# Patient Record
Sex: FEMALE | Race: WHITE | NOT HISPANIC OR LATINO | Employment: OTHER | URBAN - METROPOLITAN AREA
[De-identification: names, ages, dates, MRNs, and addresses within clinical notes are randomized per-mention and may not be internally consistent; named-entity substitution may affect disease eponyms.]

---

## 2019-09-24 ENCOUNTER — OFFICE VISIT - HEALTHEAST (OUTPATIENT)
Dept: FAMILY MEDICINE | Facility: CLINIC | Age: 79
End: 2019-09-24

## 2019-09-24 DIAGNOSIS — I10 HYPERTENSION, UNSPECIFIED TYPE: ICD-10-CM

## 2019-09-24 DIAGNOSIS — J01.00 ACUTE MAXILLARY SINUSITIS, RECURRENCE NOT SPECIFIED: ICD-10-CM

## 2019-09-24 RX ORDER — AMITRIPTYLINE HYDROCHLORIDE 10 MG/1
10 TABLET ORAL
Status: SHIPPED | COMMUNITY
Start: 2018-06-06 | End: 2022-12-17

## 2019-09-24 RX ORDER — LEVOTHYROXINE SODIUM 100 UG/1
TABLET ORAL
Status: SHIPPED | COMMUNITY
Start: 2019-06-11 | End: 2022-12-17

## 2019-09-24 RX ORDER — PRAVASTATIN SODIUM 40 MG
40 TABLET ORAL
Status: SHIPPED | COMMUNITY
Start: 2019-07-10 | End: 2022-12-17

## 2019-09-24 RX ORDER — LEVOTHYROXINE SODIUM 100 MCG
TABLET ORAL
Status: SHIPPED | COMMUNITY
Start: 2019-08-26 | End: 2022-12-17

## 2019-09-24 RX ORDER — LOSARTAN POTASSIUM 100 MG/1
TABLET ORAL
Status: SHIPPED | COMMUNITY
Start: 2019-08-12 | End: 2022-12-17

## 2019-09-24 RX ORDER — ATENOLOL 25 MG/1
25 TABLET ORAL
Status: SHIPPED | COMMUNITY
Start: 2019-06-11 | End: 2022-12-17

## 2019-09-24 RX ORDER — DOCUSATE SODIUM 100 MG/1
CAPSULE, LIQUID FILLED ORAL
Status: SHIPPED | COMMUNITY
Start: 2019-07-19 | End: 2022-12-17

## 2019-09-24 RX ORDER — DOCUSATE SODIUM 100 MG/1
200 CAPSULE, LIQUID FILLED ORAL
Status: SHIPPED | COMMUNITY
Start: 2019-03-25

## 2019-09-24 RX ORDER — ASPIRIN 325 MG
325 TABLET, DELAYED RELEASE (ENTERIC COATED) ORAL
Status: SHIPPED | COMMUNITY
Start: 2017-09-28 | End: 2022-12-17

## 2019-09-24 RX ORDER — EZETIMIBE 10 MG/1
10 TABLET ORAL
Status: SHIPPED | COMMUNITY
Start: 2019-06-11

## 2019-09-24 ASSESSMENT — MIFFLIN-ST. JEOR: SCORE: 1260.42

## 2021-06-01 NOTE — PATIENT INSTRUCTIONS - HE
You were seen today for a sinus infection.    Symptoms management:  - May use Tylenol or Ibuprofen for discomfort and/or fever if present  - May try saline irrigation to relieve congestion (see instructions below)  - Use of nasal steroids (Flonase) as prescribed  - Take antibiotics as prescribed for the full course (even if symptoms have improved)    Reasons to come back for re-evaluation:  - Develop a fever of 100.4F or current fever worsens  - Troubles seeing or double vision  - Swelling or redness around one or both eyes  - Sfiff neck  - Symptoms are not improving over the next 5 days    Buffered normal saline nasal irrigation   The benefits   1. Saline (saltwater) washes the mucus and irritants from your nose.   2. The sinus passages are moisturized.   3. Studies have also shown that a nasal irrigation improves cell function (the cells that move the mucus work better).   The recipe   Use a one-quart glass jar that is thoroughly cleansed.   You may use a large medical syringe (30 cc), water pick with an irrigation tip (preferred method), squeeze bottle, or Neti pot. Do not use a baby bulb syringe. The syringe or pick should be sterilized frequently or replaced every two to three weeks to avoid contamination and infection.   Fill with water that has been distilled, previously boiled, or otherwise sterilized. Plain tap water is not recommended, because it is not necessarily sterile.   Add 1 to 1  heaping teaspoons of pickling/nilton salt. Do not use table salt, because it contains a large number of additives.   Add 1 teaspoon of baking soda (pure bicarbonate).   Mix ingredients together, and store at room temperature. Discard after one week.   You may also make up a solution from premixed packets that are commercially prepared specifically for nasal irrigation.   The instructions   Irrigate your nose with saline one to two times per day.   If you have been told to use nasal medication, you should always use your  saline solution first. The nasal medication is much more effective when sprayed onto clean nasal membranes, and the spray will reach deeper into the nose.   Pour the amount of fluid you plan to use into a clean bowl. Do not put your used syringe back into the storage container, because it contaminates your solution.   You may warm the solution slightly in the microwave, but be sure that the solution is not hot.   Bend over the sink (some people do this in the shower) and squirt the solution into each side of your nose, aiming the stream toward the back of your head, not the top of your head. The solution should flow into one nostril and out of the other, but it will not harm you if you swallow a little.   Some people experience a little burning sensation the first few times they use buffered saline solution, but this usually goes away after they adapt to it.

## 2021-06-01 NOTE — PROGRESS NOTES
Assessment:       1. Acute maxillary sinusitis, recurrence not specified  doxycycline (VIBRA-TABS) 100 MG tablet    fluticasone (VERAMYST) 27.5 mcg/actuation nasal spray   2. Hypertension, unspecified type       Medical Decision Making  Patient presents with headache concerning for a bacterial sinusitis.  She is also noted to be hypertensive.  She has no other neurological findingsher and denies vision changes, nausea, vomiting, numbness/tingling, and slurred speech.  Patient has continued to take her blood pressure medications regularly.  She has noted worsening congestion for 2 weeks most consistent with a bacterial sinusitis and tenderness to palpation of the sinuses.  Despite patient complaining of chest heaviness, see no signs of pneumonia with good lung auscultation on exam.  Patient states she has been stressed this last week due to recent travel and has been taking large doses of Motrin for headaches, which I believe are contributing to her hypertension today.  Recommended watching symptoms closely, and to return immediately in the emergency room for reevaluation if symptoms worsen.  Also recommend the patient follow-up nonurgently with her PCP for blood pressure recheck in 1 to 2 weeks.      Plan:       Doxycycline per orders.  Discontinue use of ibuprofen, may continue to use acetaminophen.  Trial of nasal steroids.  Probiotics recommended.  Continue to drink plenty fluids.  Discussed signs of worsening symptoms and when to follow-up with PCP if no symptom improvement.      Patient Instructions   You were seen today for a sinus infection.    Symptoms management:  - May use Tylenol or Ibuprofen for discomfort and/or fever if present  - May try saline irrigation to relieve congestion (see instructions below)  - Use of nasal steroids (Flonase) as prescribed  - Take antibiotics as prescribed for the full course (even if symptoms have improved)    Reasons to come back for re-evaluation:  - Develop a fever of  100.4F or current fever worsens  - Troubles seeing or double vision  - Swelling or redness around one or both eyes  - Sfiff neck  - Symptoms are not improving over the next 5 days    Buffered normal saline nasal irrigation   The benefits   1. Saline (saltwater) washes the mucus and irritants from your nose.   2. The sinus passages are moisturized.   3. Studies have also shown that a nasal irrigation improves cell function (the cells that move the mucus work better).   The recipe   Use a one-quart glass jar that is thoroughly cleansed.   You may use a large medical syringe (30 cc), water pick with an irrigation tip (preferred method), squeeze bottle, or Neti pot. Do not use a baby bulb syringe. The syringe or pick should be sterilized frequently or replaced every two to three weeks to avoid contamination and infection.   Fill with water that has been distilled, previously boiled, or otherwise sterilized. Plain tap water is not recommended, because it is not necessarily sterile.   Add 1 to 1  heaping teaspoons of pickling/nilton salt. Do not use table salt, because it contains a large number of additives.   Add 1 teaspoon of baking soda (pure bicarbonate).   Mix ingredients together, and store at room temperature. Discard after one week.   You may also make up a solution from premixed packets that are commercially prepared specifically for nasal irrigation.   The instructions   Irrigate your nose with saline one to two times per day.   If you have been told to use nasal medication, you should always use your saline solution first. The nasal medication is much more effective when sprayed onto clean nasal membranes, and the spray will reach deeper into the nose.   Pour the amount of fluid you plan to use into a clean bowl. Do not put your used syringe back into the storage container, because it contaminates your solution.   You may warm the solution slightly in the microwave, but be sure that the solution is not hot.    Bend over the sink (some people do this in the shower) and squirt the solution into each side of your nose, aiming the stream toward the back of your head, not the top of your head. The solution should flow into one nostril and out of the other, but it will not harm you if you swallow a little.   Some people experience a little burning sensation the first few times they use buffered saline solution, but this usually goes away after they adapt to it.         Subjective:       Dolores Sumner is a 78 y.o. female here for evaluation of a possible sinus infection.  Symptoms began 2 weeks ago with acute worsening today.  Symptoms include a cough with heavy chest, dyspnea on exertion, fatigue, and facial pain.  She has been taking Motrin, Mucinex, and Coricidin with no relief.  She denies fevers.  She does not having a history of hypertension.  She is currently taking losartan and atenolol daily.  She denies nausea, vomiting, vision changes, dizziness, and slurred speech.    The following portions of the patient's history were reviewed and updated as appropriate: allergies, current medications and problem list.    Review of Systems  Pertinent items are noted in HPI.     Allergies  Allergies   Allergen Reactions     Adhesive Tape-Silicones Rash     Amoxicillin-Pot Clavulanate Diarrhea     Within 1 day of starting medication     Erythromycin Nausea Only and Rash     Lovastatin      myalgia     Rosuvastatin      myalgia     Simvastatin      myalgia     Suture, Monocryl      CAT GUT   SKIN DOES NOT HEAL       No family history on file.    Social History     Socioeconomic History     Marital status:      Spouse name: None     Number of children: None     Years of education: None     Highest education level: None   Occupational History     None   Social Needs     Financial resource strain: None     Food insecurity:     Worry: None     Inability: None     Transportation needs:     Medical: None     Non-medical: None  "  Tobacco Use     Smoking status: Never Smoker     Smokeless tobacco: Never Used   Substance and Sexual Activity     Alcohol use: None     Drug use: None     Sexual activity: None   Lifestyle     Physical activity:     Days per week: None     Minutes per session: None     Stress: None   Relationships     Social connections:     Talks on phone: None     Gets together: None     Attends Jainism service: None     Active member of club or organization: None     Attends meetings of clubs or organizations: None     Relationship status: None     Intimate partner violence:     Fear of current or ex partner: None     Emotionally abused: None     Physically abused: None     Forced sexual activity: None   Other Topics Concern     None   Social History Narrative     None         Objective:       BP (!) 198/85 (Patient Site: Right Arm, Patient Position: Sitting, Cuff Size: Adult Large)   Pulse 64   Temp 97.9  F (36.6  C) (Oral)   Resp 20   Ht 5' 3\" (1.6 m)   Wt 181 lb 1 oz (82.1 kg)   SpO2 95%   Breastfeeding? No   BMI 32.07 kg/m    General appearance: alert, appears stated age, cooperative, no distress and non-toxic  Head: Normocephalic, without obvious abnormality, atraumatic, sinuses tender to percussion  Ears: TMs intact with mucoid fluid and bulging, no erythema  Nose: no discharge  Throat: lips, mucosa, and tongue normal; teeth and gums normal  Neck: no adenopathy and supple, symmetrical, trachea midline  Lungs: clear to auscultation bilaterally and No rhonchi, rales, or wheezing  Heart: regular rate and rhythm, S1, S2 normal, no murmur, click, rub or gallop  Extremities: extremities normal, atraumatic, no cyanosis or edema      "

## 2021-06-03 VITALS
HEIGHT: 63 IN | TEMPERATURE: 97.9 F | SYSTOLIC BLOOD PRESSURE: 198 MMHG | WEIGHT: 181.06 LBS | DIASTOLIC BLOOD PRESSURE: 85 MMHG | HEART RATE: 64 BPM | RESPIRATION RATE: 20 BRPM | OXYGEN SATURATION: 95 % | BODY MASS INDEX: 32.08 KG/M2

## 2022-12-17 ENCOUNTER — ANCILLARY PROCEDURE (OUTPATIENT)
Dept: GENERAL RADIOLOGY | Facility: CLINIC | Age: 82
End: 2022-12-17
Attending: FAMILY MEDICINE
Payer: MEDICARE

## 2022-12-17 ENCOUNTER — OFFICE VISIT (OUTPATIENT)
Dept: FAMILY MEDICINE | Facility: CLINIC | Age: 82
End: 2022-12-17
Payer: MEDICARE

## 2022-12-17 VITALS
OXYGEN SATURATION: 95 % | BODY MASS INDEX: 29.9 KG/M2 | RESPIRATION RATE: 16 BRPM | HEART RATE: 83 BPM | SYSTOLIC BLOOD PRESSURE: 159 MMHG | DIASTOLIC BLOOD PRESSURE: 85 MMHG | TEMPERATURE: 98 F | WEIGHT: 168.8 LBS

## 2022-12-17 DIAGNOSIS — H10.32 ACUTE BACTERIAL CONJUNCTIVITIS OF LEFT EYE: ICD-10-CM

## 2022-12-17 DIAGNOSIS — R05.1 ACUTE COUGH: ICD-10-CM

## 2022-12-17 DIAGNOSIS — H65.03 BILATERAL ACUTE SEROUS OTITIS MEDIA, RECURRENCE NOT SPECIFIED: ICD-10-CM

## 2022-12-17 DIAGNOSIS — B96.89 ACUTE BACTERIAL RHINOSINUSITIS: ICD-10-CM

## 2022-12-17 DIAGNOSIS — J01.90 ACUTE BACTERIAL RHINOSINUSITIS: ICD-10-CM

## 2022-12-17 DIAGNOSIS — J18.0 BRONCHOPNEUMONIA: Primary | ICD-10-CM

## 2022-12-17 PROBLEM — T50.905A ADVERSE REACTION TO DRUG: Status: ACTIVE | Noted: 2018-01-03

## 2022-12-17 PROBLEM — U07.1 COVID-19: Status: ACTIVE | Noted: 2022-01-13

## 2022-12-17 PROBLEM — E78.5 DYSLIPIDEMIA: Status: ACTIVE | Noted: 2020-01-13

## 2022-12-17 PROBLEM — I35.0 AORTIC VALVE STENOSIS: Status: ACTIVE | Noted: 2020-02-01

## 2022-12-17 PROBLEM — I70.213 ATHEROSCLEROSIS OF NATIVE ARTERIES OF EXTREMITIES WITH INTERMITTENT CLAUDICATION, BILATERAL LEGS (H): Status: ACTIVE | Noted: 2019-03-25

## 2022-12-17 PROBLEM — I50.33 ACUTE ON CHRONIC DIASTOLIC HEART FAILURE (H): Status: ACTIVE | Noted: 2020-02-03

## 2022-12-17 PROBLEM — Z98.890 HISTORY OF STENT INSERTION OF RENAL ARTERY: Status: ACTIVE | Noted: 2022-12-17

## 2022-12-17 PROBLEM — H35.3230 BILATERAL EXUDATIVE AGE-RELATED MACULAR DEGENERATION (H): Status: ACTIVE | Noted: 2022-01-07

## 2022-12-17 PROBLEM — D35.02 ADENOMA OF LEFT ADRENAL GLAND: Status: ACTIVE | Noted: 2021-01-25

## 2022-12-17 PROBLEM — R06.00 DYSPNEA: Status: ACTIVE | Noted: 2020-01-13

## 2022-12-17 PROBLEM — I73.9 PERIPHERAL VASCULAR DISEASE (H): Status: ACTIVE | Noted: 2018-01-03

## 2022-12-17 PROBLEM — I65.23 BILATERAL CAROTID ARTERY STENOSIS: Status: ACTIVE | Noted: 2020-05-10

## 2022-12-17 PROBLEM — I25.10 CORONARY ATHEROSCLEROSIS: Status: ACTIVE | Noted: 2020-02-10

## 2022-12-17 PROBLEM — Z95.2 S/P TAVR (TRANSCATHETER AORTIC VALVE REPLACEMENT): Status: ACTIVE | Noted: 2020-04-07

## 2022-12-17 PROBLEM — I77.0: Status: ACTIVE | Noted: 2021-03-13

## 2022-12-17 PROCEDURE — 99204 OFFICE O/P NEW MOD 45 MIN: CPT | Performed by: FAMILY MEDICINE

## 2022-12-17 PROCEDURE — 71046 X-RAY EXAM CHEST 2 VIEWS: CPT | Mod: TC | Performed by: RADIOLOGY

## 2022-12-17 RX ORDER — OLMESARTAN MEDOXOMIL AND HYDROCHLOROTHIAZIDE 40/25 40; 25 MG/1; MG/1
1 TABLET ORAL DAILY
COMMUNITY
End: 2022-12-17

## 2022-12-17 RX ORDER — ATORVASTATIN CALCIUM 40 MG/1
40 TABLET, FILM COATED ORAL DAILY
COMMUNITY

## 2022-12-17 RX ORDER — CLOPIDOGREL BISULFATE 75 MG/1
75 TABLET ORAL
COMMUNITY
End: 2022-12-17

## 2022-12-17 RX ORDER — DOXYCYCLINE HYCLATE 100 MG
100 TABLET ORAL 2 TIMES DAILY
Qty: 20 TABLET | Refills: 0 | Status: SHIPPED | OUTPATIENT
Start: 2022-12-17 | End: 2022-12-27

## 2022-12-17 RX ORDER — METOPROLOL SUCCINATE 100 MG/1
TABLET, EXTENDED RELEASE ORAL
COMMUNITY
Start: 2022-02-18 | End: 2022-12-17

## 2022-12-17 RX ORDER — FUROSEMIDE 20 MG
20 TABLET ORAL
COMMUNITY
Start: 2021-06-03 | End: 2022-12-17

## 2022-12-17 RX ORDER — OLMESARTAN MEDOXOMIL, AMLODIPINE AND HYDROCHLOROTHIAZIDE TABLET 40/10/25 MG 40; 10; 25 MG/1; MG/1; MG/1
TABLET ORAL EVERY 24 HOURS
COMMUNITY
Start: 2022-07-11 | End: 2022-12-17

## 2022-12-17 RX ORDER — AMLODIPINE BESYLATE 10 MG/1
10 TABLET ORAL
COMMUNITY
Start: 2021-06-08 | End: 2022-12-17 | Stop reason: ALTCHOICE

## 2022-12-17 RX ORDER — TOBRAMYCIN 3 MG/ML
SOLUTION/ DROPS OPHTHALMIC
Qty: 4 ML | Refills: 0 | Status: SHIPPED | OUTPATIENT
Start: 2022-12-17 | End: 2022-12-24

## 2022-12-17 RX ORDER — BENZONATATE 200 MG/1
200 CAPSULE ORAL 3 TIMES DAILY PRN
Qty: 30 CAPSULE | Refills: 0 | Status: SHIPPED | OUTPATIENT
Start: 2022-12-17

## 2022-12-17 RX ORDER — ASPIRIN 81 MG/1
81 TABLET, CHEWABLE ORAL DAILY
COMMUNITY

## 2022-12-17 RX ORDER — HYDRALAZINE HYDROCHLORIDE 25 MG/1
25 TABLET, FILM COATED ORAL
COMMUNITY
Start: 2021-05-10 | End: 2022-12-17

## 2022-12-17 RX ORDER — FLUTICASONE PROPIONATE 50 MCG
1 SPRAY, SUSPENSION (ML) NASAL 2 TIMES DAILY
Qty: 16 G | Refills: 0 | Status: SHIPPED | OUTPATIENT
Start: 2022-12-17

## 2022-12-17 RX ORDER — LOSARTAN POTASSIUM 100 MG/1
100 TABLET ORAL
COMMUNITY
Start: 2021-06-08 | End: 2022-12-17

## 2022-12-17 RX ORDER — OLMESARTAN MEDOXOMIL, AMLODIPINE AND HYDROCHLOROTHIAZIDE TABLET 20/5/12.5 MG 20; 5; 12.5 MG/1; MG/1; MG/1
1 TABLET ORAL DAILY
COMMUNITY
Start: 2022-10-08

## 2022-12-17 RX ORDER — METOPROLOL SUCCINATE 25 MG/1
100 TABLET, EXTENDED RELEASE ORAL
COMMUNITY
Start: 2021-06-03 | End: 2022-12-17

## 2022-12-17 RX ORDER — ATORVASTATIN CALCIUM 40 MG/1
40 TABLET, FILM COATED ORAL
COMMUNITY
Start: 2021-06-08 | End: 2022-12-17

## 2022-12-17 RX ORDER — ALBUTEROL SULFATE 90 UG/1
2 AEROSOL, METERED RESPIRATORY (INHALATION) EVERY 6 HOURS
Qty: 18 G | Refills: 0 | Status: SHIPPED | OUTPATIENT
Start: 2022-12-17

## 2022-12-17 NOTE — PATIENT INSTRUCTIONS
Start doxycycline 2 times a day for 10 days always take with food to prevent nausea vomiting even the pharmacist tells you not to take it with food     Start tobramycin eyedrops every 2 hours until sleep tonight and left eye then starting tomorrow 4 times a day for the remaining 6 days    if the other eye becomes red or discharge use drops in that eye as well    Throw away your over the counter eye drops you use daily  and start new drops after you finish the antibiotic drops      Start Flonase1 spray in each nostril in am and bedtime for sinus congestion and fluid behind the ear drums    Start Mucinex -1200 mg daily -the generic is fine -for congestion/sinus congestion-will also help with fluid behind the ear drums      Take tessalon perles ( benzonatate) pills for cough up to 3 times a day will not make you sleepy      Start albuterol inhaler 2 puffs in a.m. and bedtime and every 4-6 hours as needed for wheezing  if you are having a coughing spell you can take 2 puffs as needed to help decrease the cough     if you are requiring every 2 hours use of the albuterol inhaler, shortness of breath and/or fever  to ER

## 2022-12-17 NOTE — PROGRESS NOTES
ASSESSMENT/PLAN:      ICD-10-CM    1. Bronchopneumonia  J18.0 XR Chest 2 Views     benzonatate (TESSALON) 200 MG capsule     doxycycline hyclate (VIBRA-TABS) 100 MG tablet     albuterol (PROAIR HFA/PROVENTIL HFA/VENTOLIN HFA) 108 (90 Base) MCG/ACT inhaler     fluticasone (FLONASE) 50 MCG/ACT nasal spray      2. Acute bacterial rhinosinusitis  J01.90     B96.89       3. Bilateral acute serous otitis media, recurrence not specified  H65.03       4. Acute bacterial conjunctivitis of left eye  H10.32 tobramycin (TOBREX) 0.3 % ophthalmic solution    2 days, mattering, grand child with pink eye                 Reviewed medication instructions and side effects. Follow up if experiences side effects.     I reviewed supportive care, otc meds to use if needed, expected course, and signs of concern.  Follow up as needed or if she does not improve within  1-2 days or if worsens in any way.  Reviewed red flag symptoms and is to go to the ER if experiences any of these.     The use of Dragon/Wejo dictation services may have been used to construct the content in this note; any grammatical or spelling errors are non-intentional. Please contact the author of this note directly if you are in need of any clarification.      On the day of the encounter, time spend on chart review, patient visit, review of testing, documentation was 45 minutes          Patient Instructions     Start doxycycline 2 times a day for 10 days always take with food to prevent nausea vomiting even the pharmacist tells you not to take it with food     Start tobramycin eyedrops every 2 hours until sleep tonight and left eye then starting tomorrow 4 times a day for the remaining 6 days    if the other eye becomes red or discharge use drops in that eye as well    Throw away your over the counter eye drops you use daily  and start new drops after you finish the antibiotic drops      Start Flonase1 spray in each nostril in am and bedtime for sinus congestion  and fluid behind the ear drums    Start Mucinex -1200 mg daily -the generic is fine -for congestion/sinus congestion-will also help with fluid behind the ear drums      Take tessalon perles ( benzonatate) pills for cough up to 3 times a day will not make you sleepy      Start albuterol inhaler 2 puffs in a.m. and bedtime and every 4-6 hours as needed for wheezing  if you are having a coughing spell you can take 2 puffs as needed to help decrease the cough     if you are requiring every 2 hours use of the albuterol inhaler, shortness of breath and/or fever  to ER                   Patient presents with:  URI: Cold sx for 3 weeks, chest congestion       Subjective     Dolores Sumner is a 82 year old female who presents to clinic today for the following health issues:    HPI     URI Adult    Onset of symptoms was 3 week(s) ago.  Course of illness is worsening.    Severity moderate  Current and Associated symptoms: stuffy nose, cough - productive, sore throat, facial pain/pressure and fatigue, left eye drainage and mattering    no fever/chills, chills at onset of symptoms, cough has worsened, no sob/no tavarez  Treatment measures tried include Tylenol/ and OTC Cough med.  Predisposing factors include ill contact: Family member-grand child with pink eye and flight to [place] for thanksgiving       covid vaccinated- bivalent and all boosters   Flu shot   covid-2/2022       No past medical history on file.  Social History     Tobacco Use     Smoking status: Never     Smokeless tobacco: Never   Substance Use Topics     Alcohol use: Not on file       Current Outpatient Medications   Medication Sig Dispense Refill     albuterol (PROAIR HFA/PROVENTIL HFA/VENTOLIN HFA) 108 (90 Base) MCG/ACT inhaler Inhale 2 puffs into the lungs every 6 hours 18 g 0     aspirin (ASA) 81 MG chewable tablet Take 81 mg by mouth daily       atorvastatin (LIPITOR) 40 MG tablet Take 40 mg by mouth daily       benzonatate (TESSALON) 200 MG capsule Take 1  capsule (200 mg) by mouth 3 times daily as needed for cough 30 capsule 0     docusate sodium (COLACE) 100 MG capsule [DOCUSATE SODIUM (COLACE) 100 MG CAPSULE] Take 200 mg by mouth.       doxycycline hyclate (VIBRA-TABS) 100 MG tablet Take 1 tablet (100 mg) by mouth 2 times daily for 10 days 20 tablet 0     ezetimibe (ZETIA) 10 mg tablet [EZETIMIBE (ZETIA) 10 MG TABLET] Take 10 mg by mouth.       fluticasone (FLONASE) 50 MCG/ACT nasal spray Spray 1 spray into both nostrils 2 times daily 16 g 0     Olmesartan-amLODIPine-HCTZ 20-5-12.5 MG TABS Take 1 tablet by mouth daily       tobramycin (TOBREX) 0.3 % ophthalmic solution Place 1-2 drops Into the left eye every 2 hours for 1 day, THEN 1-2 drops 4 times daily for 6 days. 4 mL 0     diphenhydrAMINE (BENADRYL) 25 MG tablet Take 25 mg by mouth       Allergies   Allergen Reactions     Adhesive Tape-Silicones [Adhesive Tape] Rash     Amoxicillin-Pot Clavulanate [Augmentin] Diarrhea     Within 1 day of starting medication     Erythromycin Nausea and Rash     Lovastatin Unknown     myalgia     Rosuvastatin Unknown     myalgia     Simvastatin Unknown     myalgia     Suture, Monocryl [Suture] Unknown     CAT GUT , SKIN DOES NOT HEAL             ROS are negative, except as otherwise noted HPI      Objective    BP (!) 159/85   Pulse 83   Temp 98  F (36.7  C) (Oral)   Resp 16   Wt 76.6 kg (168 lb 12.8 oz)   SpO2 95%   BMI 29.90 kg/m    Body mass index is 29.9 kg/m .  Physical Exam   GENERAL:alert and mild fatigue  EYES: Eyes grossly normal to inspection on right, mild ejected sclera and conjuctiva on left, thin crusting lower eyelid,, PERRL EOMI  HENT: ear canals clear  and TM's dull/air fluid levels bilateral  Nose congestion  and mouth without ulcers or lesions, posterior pharynx mildly injected   NECK: no adenopathy, no asymmetry,   RESP: lungs decreased breath sounds, right base - no rales, rhonchi or wheezes  CV: regular rate and rhythm, , no murmur, click or rub,   MS:  no gross musculoskeletal defects noted, no edema  NEURO: Normal strength and tone, mentation intact and speech normal  Gait normal     Diagnostic Test Results:  Labs reviewed in Epic  Results for orders placed or performed in visit on 12/17/22   XR Chest 2 Views     Status: None    Narrative    EXAM: XR CHEST 2 VIEWS  LOCATION: [facility]  DATE/TIME: 12/17/2022 10:42 AM    INDICATION: Three-week history of cough, decreased breath sounds in the right lung base  COMPARISON: None.      Impression    IMPRESSION: Transcatheter aortic valve replacement. Right coronary artery stent. Calcified aortic atherosclerosis. Mild degenerative change thoracic spine. Lungs clear.

## 2025-01-05 ENCOUNTER — OFFICE VISIT (OUTPATIENT)
Dept: URGENT CARE | Facility: URGENT CARE | Age: 85
End: 2025-01-05
Payer: MEDICARE

## 2025-01-05 VITALS
WEIGHT: 160 LBS | SYSTOLIC BLOOD PRESSURE: 177 MMHG | HEART RATE: 83 BPM | TEMPERATURE: 97.7 F | BODY MASS INDEX: 28.34 KG/M2 | DIASTOLIC BLOOD PRESSURE: 81 MMHG | OXYGEN SATURATION: 97 % | RESPIRATION RATE: 16 BRPM

## 2025-01-05 DIAGNOSIS — R05.1 ACUTE COUGH: Primary | ICD-10-CM

## 2025-01-05 PROCEDURE — 99214 OFFICE O/P EST MOD 30 MIN: CPT | Performed by: FAMILY MEDICINE

## 2025-01-05 RX ORDER — ACETAMINOPHEN 500 MG
500 TABLET ORAL
COMMUNITY

## 2025-01-05 RX ORDER — BENZONATATE 100 MG/1
100 CAPSULE ORAL 3 TIMES DAILY PRN
Qty: 21 CAPSULE | Refills: 1 | Status: SHIPPED | OUTPATIENT
Start: 2025-01-05

## 2025-01-05 RX ORDER — DOXYCYCLINE HYCLATE 100 MG
100 TABLET ORAL 2 TIMES DAILY
Qty: 14 TABLET | Refills: 0 | Status: SHIPPED | OUTPATIENT
Start: 2025-01-05 | End: 2025-01-12

## 2025-01-05 RX ORDER — VITAMIN B COMPLEX
2000 TABLET ORAL
COMMUNITY

## 2025-01-05 NOTE — PROGRESS NOTES
Assessment & Plan     Acute cough  - doxycycline hyclate (VIBRA-TABS) 100 MG tablet  Dispense: 14 tablet; Refill: 0  - benzonatate (TESSALON) 100 MG capsule  Dispense: 21 capsule; Refill: 1       Given the duration of symptoms and reports of persistent and possibly worsening cough although no shortness of breath we came to shared decision to proceed with doxycycline to cover for walking pneumonia which has been prevalent in the surrounding area.  We discussed if symptoms not better in the next 48 hours to return to be evaluated.  Vital signs stable.  Patient appears well-hydrated.  Tessalon/honey advised to help with cough severity.     See AVS summary for additional recommendations reviewed with patient during this visit.       Nemesio Nur MD   [facility]    Anabella Bernal is a 84 year old female who presents to clinic today for the following health issues:  Chief Complaint   Patient presents with    URI     Productive cough with yellow gray phlegm for rough 2 week. Chest heaviness.      HPI    Patient has been here visiting her daughter she lives in [place] has had a cough for about 2 weeks possibly worsened over the last few days but has not had any fevers.  There was some other family members who are sick but improved by now.  No history of lung disease.  Previously an occasional smoker but quit many decades ago.  No sinus pressure or heavy drainage.  History of tonsillectomy    Using mucinex and cough suppressant    No new pharyngitis    No exposures to individuals with COVID/pneumonia/pertussis  Patient Active Problem List    Diagnosis Date Noted    History of stent insertion of renal artery 12/17/2022     Priority: Medium    COVID-19 01/13/2022     Priority: Medium    Bilateral exudative age-related macular degeneration (H) 01/07/2022     Priority: Medium     Last Assessment & Plan:   Formatting of this note might be different from the original.  Getting inj's.      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  Hormonal testing neg 2021     24 mm in 2011 - 2011 CT mentions it was present in 2008 2012 CT - stable   2015 CT - 22 mm left adrenal nodule   2020 CT - 18 mm left adrenal nodule      Bilateral carotid artery stenosis 05/10/2020     Priority: Medium     Formatting of this note might be different from the original.  Formatting of this note might be different from the original.  60-69% left 2021  Formatting of this note might be different from the original.  60-69% left 2021     Last Assessment & Plan:   Formatting of this note might be different from the original.  60-69% left 2021   Plan: recheck w/ vascular thi year      S/P TAVR (transcatheter aortic valve replacement) 04/07/2020     Priority: Medium     Last Assessment & Plan:   Formatting of this note might be different from the original.  TAVR serg jorge. recenc echo      Coronary atherosclerosis 02/10/2020     Priority: Medium     Last Assessment & Plan:   Formatting of this note might be different from the original.  No cp.   Mild SOB w/ exertion - for many years w/o change.      Acute on chronic diastolic heart failure (H) 02/03/2020     Priority: Medium    Aortic valve stenosis 02/01/2020     Priority: Medium    Dyslipidemia 01/13/2020     Priority: Medium    Dyspnea 01/13/2020     Priority: Medium    Atherosclerosis of native arteries of extremities with intermittent claudication, bilateral legs (H) 03/25/2019     Priority: Medium     Last Assessment & Plan:   Formatting of this note might be different from the original.  Regular exercise w/ walking several x per week   Gets b/l calf claudication @ 100 ft and resolved w/ rest.   She has f/u w/ her vascular surgeon soon .       Adverse reaction to drug 01/03/2018     Priority: Medium    Peripheral vascular disease (H) 01/03/2018     Priority: Medium    Subclavian artery stenosis, left (H) 04/16/2015     Priority: Medium     Formatting of this note might be different from the original.  Formatting of this note might be different from the original.  See vascular note 4/2015; ultrasounded 8/2018  Formatting of this note might be different from the original.  See vascular note 4/2015; ultrasounded 8/2018      Glaucoma suspect 12/17/2010     Priority: Medium     Last Assessment & Plan:   Formatting of this note might be different from the original.  Seeing eye doc      Bruit 11/09/2010     Priority: Medium    HTN (hypertension), benign 11/09/2010     Priority: Medium     Formatting of this note might be different from the original.  Last Assessment & Plan:   Formatting of this note might be different from the original.  Controlled  Last Assessment & Plan:   Formatting of this note might be different from the original.  Htn - off hydralazine and metoprolol   Reduced combo bp pill to 1/2 pill   < 140/80 at home   Cont same tx      Mixed hyperlipidemia 11/09/2010     Priority: Medium     Last Assessment & Plan:   Formatting of this note might be different from the original.  Cont statin      Osteopenia 11/09/2010     Priority: Medium     Formatting of this note might be different from the original.  Last Assessment & Plan:   Formatting of this note might be different from the original.  Recommend repeat dexa. Pt declines  Formatting of this note might be different from the original.  High frax score 2022    Last Assessment & Plan:   Formatting of this note might be different from the original.  Plan dexa      Primary hypothyroidism 11/09/2010     Priority: Medium     Formatting of this note might be different from the original.  Last Assessment & Plan:   Formatting of this note might be different from the original.  Controlled  Last Assessment &  Plan:   Formatting of this note might be different from the original.  Cont same dose.      Vitamin D deficiency 11/09/2010     Priority: Medium     Last Assessment & Plan:   Formatting of this note might be different from the original.  On D      History of bladder cancer 11/06/2010     Priority: Medium     Formatting of this note might be different from the original.  Last Assessment & Plan:   Formatting of this note might be different from the original.  Pt due to see dr chapa this fall  Last Assessment & Plan:   Formatting of this note might be different from the original.  Pt due to see dr chapa this fall      Renal artery stenosis (H) 11/06/2010     Priority: Medium     Last Assessment & Plan:   Formatting of this note might be different from the original.  Seeing vasc yearly.    Formatting of this note might be different from the original.  Last Assessment & Plan:   Formatting of this note might be different from the original.  Seeing vasc yearly.    Formatting of this note might be different from the original.  5/2021 U/s         Current Outpatient Medications   Medication Sig Dispense Refill    acetaminophen (TYLENOL) 500 MG tablet Take 500 mg by mouth.      albuterol (PROAIR HFA/PROVENTIL HFA/VENTOLIN HFA) 108 (90 Base) MCG/ACT inhaler Inhale 2 puffs into the lungs every 6 hours 18 g 0    aspirin (ASA) 81 MG chewable tablet Take 81 mg by mouth daily      atorvastatin (LIPITOR) 40 MG tablet Take 40 mg by mouth daily      benzonatate (TESSALON) 100 MG capsule Take 1 capsule (100 mg) by mouth 3 times daily as needed for cough. 21 capsule 1    benzonatate (TESSALON) 200 MG capsule Take 1 capsule (200 mg) by mouth 3 times daily as needed for cough 30 capsule 0    diphenhydrAMINE (BENADRYL) 25 MG tablet Take 25 mg by mouth      docusate sodium (COLACE) 100 MG capsule [DOCUSATE SODIUM (COLACE) 100 MG CAPSULE] Take 200 mg by mouth.      doxycycline hyclate (VIBRA-TABS) 100 MG tablet Take 1 tablet (100 mg) by  mouth 2 times daily for 7 days. 14 tablet 0    ezetimibe (ZETIA) 10 mg tablet [EZETIMIBE (ZETIA) 10 MG TABLET] Take 10 mg by mouth.      fluticasone (FLONASE) 50 MCG/ACT nasal spray Spray 1 spray into both nostrils 2 times daily 16 g 0    Olmesartan-amLODIPine-HCTZ 20-5-12.5 MG TABS Take 1 tablet by mouth daily      vitamin D3 25 mcg (1000 units) tablet Take 2,000 Int'l Units by mouth.       No current facility-administered medications for this visit.           Objective    BP (!) 177/81   Pulse 83   Temp 97.7  F (36.5  C) (Oral)   Resp 16   Wt 72.6 kg (160 lb)   SpO2 97%   BMI 28.34 kg/m    Physical Exam   As noted above and including:   GEN:NAD, normal mentation, moist mucous membranes  CV: RRR no m/r/g  Lung exam was unremarkable no wheezes rhonchi or rales, nonlabored work of breathing  No results found for any visits on 01/05/25.                  The use of Dragon/Slide dictation services may have been used to construct the content in this note; any grammatical or spelling errors are non-intentional. Please contact the author of this note directly if you are in need of any clarification.

## 2025-01-05 NOTE — PATIENT INSTRUCTIONS
Doxycycline antibiotic take twice a day for 7 days to cover for the walking pneumonia infection which has been spreading in our community      Drink 70 ounces of water/fluids a day to keep hydrated      If your symptoms worsen such as shortness of breath, fever, lightheadedness please seek help right away    For cough (take them together):   - Honey: lozenges/cough drops or mix honey in warm water  - Tessalon/Benzonatate (prescription) every 8 hours as needed